# Patient Record
(demographics unavailable — no encounter records)

---

## 2024-10-10 NOTE — PHYSICAL EXAM
[Bilateral] : foot and ankle bilaterally [NL (40)] : plantar flexion 40 degrees [NL 30)] : inversion 30 degrees [NL (20)] : eversion 20 degrees [5___] : eversion 5[unfilled]/5 [2+] : dorsalis pedis pulse: 2+ [] : patient ambulates without assistive device [FreeTextEntry8] : non specific dorsal foot pain [TWNoteComboBox7] : dorsiflexion 15 degrees
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
## 2024-10-16 NOTE — ASSESSMENT
[FreeTextEntry1] : The patient was advised of the diagnosis. The natural history of the pathology was explained in full to the patient in layman's terms. All questions were answered. The risks and benefits of surgical and non-surgical treatment alternatives were explained in full to the patient.  pt was given OT rx  Cannot take nsaids due to gastric bypass.  RTO 4 weeks
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
## 2024-10-16 NOTE — IMAGING
[de-identified] : pt with bilateral hand diffuse ttp over the 2nd - 4th MC regions with no palpable deformity all digits are nvi   and intrinsic strength is 4/5 due to guarding mild diffuse ttp over the bilateral wrists. There is no anatomic snuffbox TTP to either wrist ROM is full with pain on flexion and extension symmetrically Askew, TFCC Grind and Finkelstein Tests are negative symmetrically. Negative Tinel over the bilateral Carpal and Cubital Tunnels.  Left elbow ttp over medial condyle no ligamentous laxity ROM is full strength is 5/5 in all planes.   Right elbow with ttp over the lateral condyle no ligamentous laxity ROM is full strength is 5/5 in all planes.

## 2024-10-17 NOTE — ASSESSMENT
[FreeTextEntry1] : Needs MRIs both knees to r/o internal derangement, lumbar spine to r/o HNP PT ordered
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
## 2024-10-17 NOTE — IMAGING
[Disc space narrowing] : Disc space narrowing [Bilateral] : knee bilaterally [There are no fractures, subluxations or dislocations. No significant abnormalities are seen] : There are no fractures, subluxations or dislocations. No significant abnormalities are seen

## 2024-10-17 NOTE — PHYSICAL EXAM
[Flexion] : flexion [Bending to left] : bending to left [Bending to right] : bending to right [Bilateral] : knee bilaterally [NL (0)] : extension 0 degrees [5___] : hamstring 5[unfilled]/5 [Equivocal] : equivocal Mary [] : able to do straight leg raise [TWNoteComboBox7] : flexion 125 degrees
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
## 2024-10-22 NOTE — PHYSICAL EXAM
[Bilateral] : shoulder bilaterally [4 ___] : forward flexion 4[unfilled]/5 [4___] : external rotation 4[unfilled]/5 [] : pain with strength testing [FreeTextEntry3] : incisions R shoulder from prior surgery [FreeTextEntry9] : FE R 120  L 130 ER R 40  L 40

## 2024-10-22 NOTE — DATA REVIEWED
[Right] : of the right [MRI] : MRI [Left] : left [Shoulder] : shoulder [I independently reviewed and interpreted images and report] : I independently reviewed and interpreted images and report [FreeTextEntry1] : PRCT, bursitis [FreeTextEntry2] : AC joint sprain, bursitis
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
## 2024-10-23 NOTE — DATA REVIEWED
[MRI] : MRI [Cervical Spine] : cervical spine [I independently reviewed and interpreted images and report] : I independently reviewed and interpreted images and report [FreeTextEntry1] : multilevel HNP
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
## 2024-11-05 NOTE — DATA REVIEWED
[Lumbar Spine] : lumbar spine [MRI] : MRI [Bilateral] : of the bilateral [Knee] : knee [I reviewed the films/CD and agree] : I reviewed the films/CD and agree
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
## 2024-11-05 NOTE — REASON FOR VISIT
[FreeTextEntry2] : 11/05/2024 Had MRIs: lumbar: multilevel ddd, disc bulges, neuroforaminal narrowing. Left knee: tricompartmental OA, no meniscal tears Right knee: tricompartmental OA, no meniscal tears
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
## 2024-12-04 NOTE — IMAGING
[Straightening consistent with spasm] : Straightening consistent with spasm [Disc space narrowing] : Disc space narrowing [No instability seen on flexion/extension] : No instability seen on flexion/extension [FreeTextEntry1] : alignment corrects on extension
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
## 2024-12-05 NOTE — ASSESSMENT
[FreeTextEntry1] : Will get authorization for Euflexxa x3 both knees Continue PT for low back and knees Can see Pain Management for her low back as well
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
## 2024-12-18 NOTE — IMAGING
[de-identified] : pt with bilateral hand diffuse ttp over the 2nd - 4th MC regions with no palpable deformity all digits are nvi   and intrinsic strength is 4/5 due to guarding mild diffuse ttp over the bilateral wrists. There is no anatomic snuffbox TTP to either wrist ROM is full with pain on flexion and extension symmetrically Askew, TFCC Grind and Finkelstein Tests are negative symmetrically. Negative Tinel over the bilateral Carpal and Cubital Tunnels.  Left elbow ttp over medial condyle no ligamentous laxity ROM is full strength is 5/5 in all planes.   Right elbow with ttp over the lateral condyle and medial condyle no ligamentous laxity ROM is full strength is 5/5 in all planes.

## 2024-12-18 NOTE — IMAGING
[de-identified] : pt with bilateral hand diffuse ttp over the 2nd - 4th MC regions with no palpable deformity all digits are nvi   and intrinsic strength is 4/5 due to guarding mild diffuse ttp over the bilateral wrists. There is no anatomic snuffbox TTP to either wrist ROM is full with pain on flexion and extension symmetrically Askew, TFCC Grind and Finkelstein Tests are negative symmetrically. Negative Tinel over the bilateral Carpal and Cubital Tunnels.  Left elbow ttp over medial condyle no ligamentous laxity ROM is full strength is 5/5 in all planes.   Right elbow with ttp over the lateral condyle and medial condyle no ligamentous laxity ROM is full strength is 5/5 in all planes.

## 2024-12-18 NOTE — ASSESSMENT
[FreeTextEntry1] : The patient was advised of the diagnosis. The natural history of the pathology was explained in full to the patient in layman's terms. All questions were answered. The risks and benefits of surgical and non-surgical treatment alternatives were explained in full to the patient.  pt was given OT rx  Cannot take nsaids due to gastric bypass.  RTO 6 weeks
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
## 2024-12-19 NOTE — DISCUSSION/SUMMARY
[de-identified] : After discussing various treatment options with the patient including but not limited to oral medications, physical therapy, exercise modalities as well as interventional spinal injections, we have decided with the following plan:   - Continue Home exercises, stretching, activity modification, physical therapy, and conservative care. - MRI report and/or images was reviewed and discussed with the patient. - Recommend C7-T1 Cervical Epidural Steroid Injection under fluoroscopic guidance with image. - Of note, the C7-T1 level has been determined to be the safest level to inject in the cervical spine as the epidural space is the largest. Despite pathology at different levels, studies have shown that the medication will spread up to the most cranial level, despite the level of injection. - The risks, benefits and alternatives of the proposed procedure were explained in detail with the patient. The risks outlined include but are not limited to infection, bleeding, post-dural puncture headache, nerve injury, a temporary increase in pain, failure to resolve symptoms, allergic reaction, symptom recurrence, and possible elevation of blood sugar in diabetics. All questions were answered to patient's apparent satisfaction and he/she verbalized an understanding. - Patient is presenting with acute/sub-acute radicular pain with impairment in ADLs and functionality.  The pain has not responded to conservative care including NSAID therapy and/or physical therapy.  There is no bleeding tendency, unstable medical condition, or systemic infection. - Follow up in 1-2 weeks post injection for re-evaluation.

## 2024-12-19 NOTE — PHYSICAL EXAM
[de-identified] : Constitutional; Appears well, no apparent distress Ability to communicate: Normal  Respiratory: non-labored breathing Skin: No rash noted Head: Normocephalic, atraumatic Neck: no visible thyroid enlargement Eyes: Extraocular movements intact Neurologic: Alert and oriented x3 Psychiatric: normal mood, affect and behavior [] : positive [place]

## 2024-12-20 NOTE — ASSESSMENT
[FreeTextEntry1] : Euflexxa bilateral knees started today post injection instructions Continue PT for low back and knees Can see Pain Management for her low back as well

## 2024-12-20 NOTE — PHYSICAL EXAM
[Flexion] : flexion [Bending to left] : bending to left [Bending to right] : bending to right [Bilateral] : knee bilaterally [NL (0)] : extension 0 degrees [5___] : hamstring 5[unfilled]/5 [Equivocal] : equivocal Mary [] : negative Lachmann [TWNoteComboBox7] : flexion 125 degrees
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
## 2024-12-20 NOTE — PROCEDURE
[Large Joint Injection] : Large joint injection [Bilateral] : bilaterally of the [Knee] : knee [Pain] : pain [Alcohol] : alcohol [Betadine] : betadine [Ethyl Chloride sprayed topically] : ethyl chloride sprayed topically [Sterile technique used] : sterile technique used [Euflexxa(20mg)] : 20mg of Euflexxa [#1] : series #1
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
## 2024-12-27 NOTE — ASSESSMENT
[FreeTextEntry1] : Euflexxa 2 post injection instructions Continue PT for low back and knees Can see Pain Management for her low back as well

## 2024-12-27 NOTE — PROCEDURE
[Large Joint Injection] : Large joint injection [Bilateral] : bilaterally of the [Knee] : knee [Pain] : pain [Alcohol] : alcohol [Betadine] : betadine [Ethyl Chloride sprayed topically] : ethyl chloride sprayed topically [Sterile technique used] : sterile technique used [Euflexxa(20mg)] : 20mg of Euflexxa [#2] : series #2 [] : Patient tolerated procedure well [Call if redness, pain or fever occur] : call if redness, pain or fever occur [Apply ice for 15min out of every hour for the next 12-24 hours as tolerated] : apply ice for 15 minutes out of every hour for the next 12-24 hours as tolerated [Patient was advised to rest the joint(s) for ____ days] : patient was advised to rest the joint(s) for [unfilled] days [Previous OTC use and PT nontherapeutic] : patient has tried OTC's including aspirin, Ibuprofen, Aleve, etc or prescription NSAIDS, and/or exercises at home and/or physical therapy without satisfactory response [Patient had decreased mobility in the joint] : patient had decreased mobility in the joint [Risks, benefits, alternatives discussed / Verbal consent obtained] : the risks benefits, and alternatives have been discussed, and verbal consent was obtained
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
## 2025-01-13 NOTE — PROCEDURE
[FreeTextEntry3] : Date of Service: 01/13/2025   Account: [id number]  Patient: OUMAR SUGGS   YOB: 1975  Age: 49 year   Surgeon:                                                         Humberto Ritter D.O.  Pre-Operative Diagnosis:                            Cervical Radiculopathy (M54.12)  Post-Operative Diagnosis:                          Cervical Radiculopathy (M54.12)  Procedure:                                                     Interlaminar cervical epidural steroid injection (C7-T1) under fluoroscopic guidance  Anesthesia:                                                    Local with MAC   This procedure was carried out using fluoroscopic guidance.  The risks and benefits of the procedure were discussed extensively with the patient.  The consent of the patient was obtained and the following procedure was performed.  The patient was placed in the prone position using thoracic and chin supports.  The cervical area was prepped and draped in a sterile fashion. A timeout was performed with all essential staff present and the site and side were verified. The fluoroscope visualized the C7-T1 interspace using slight cephalad-caudad angulation and this area was marked.  Using sterile technique, the superficial skin was anesthetized with 1% Lidocaine without epinephrine.  An 18-gauge Tuohy needle was advanced under fluoroscopy using xnkhx-fdumytjcy-degvi technique until ligament was engaged.  The stylette was then removed and a column of preservative free normal saline flushed through the Tuohy needle and left with a concave fluid level above the butterfly portion of the Tuohy needle.  The needle was then advanced under fluoroscopic guidance until the column of saline disappeared.  Lateral view confirmed final needle tip placement in the epidural space.  After negative aspiration for heme and CSF, 1 cc of Omnipaque contrast injected under live fluoroscopy, confirmed good cervical epidurogram.    Cervical epidurogram showed no evidence of intrathecal or intravascular flow, and good bilateral epidural flow from C3 to T2 levels.  An injectate of 3 cc of preservative free normal saline plus 12 mg of betamethasone was then injected into the epidural space. The needle was subsequently removed and pressure was applied.  Anesthesia personnel were present throughout the procedure.  The patient tolerated the procedure well and was instructed to contact me immediately if there were any problems.  Humberto Ritter D.O.

## 2025-01-16 NOTE — ASSESSMENT
[FreeTextEntry1] : B/L shoulder strain and bursitis. H/O R RCR 9/9/21 MRI R shoulder: PRCT, bursitis MRI L shoulder: AC joint sprain, bursitis She is in PT, will continue. Diclofenac gel prn.  OOW.  RTO 8 weeks.
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
## 2025-01-16 NOTE — PHYSICAL EXAM
[Bilateral] : shoulder bilaterally [4 ___] : forward flexion 4[unfilled]/5 [] : pain with strength testing [5___] : external rotation 5[unfilled]/5 [FreeTextEntry3] : incisions R shoulder from prior surgery [FreeTextEntry9] : FE R 120  L 130 ER R 50  L 40

## 2025-01-30 NOTE — PROCEDURE
[FreeTextEntry3] : Procedure Name: Trigger Point Injection (1-2 muscle groups): Celestone and Marcaine Trigger Point was performed because of pain.  Celestone: An injection of Celestone 6 mg Marcaine: An injection of Marcaine 10 mg Medication was injected in the Bilateral Cervical Paraspinal muscle.   Patient has tried OTC's including aspirin, Ibuprofen, Aleve etc or prescription NSAIDS, and/or exercises at home and/ or physical therapy without satisfactory response. After verbal consent using sterile preparation and technique.The risks, benefits, and alternatives to cortisone injection were explained in full to the patient. Risks outlined include but are not limited to infection, sepsis, bleeding, scarring, skin discoloration, temporary increase in pain, syncopal episode, failure to resolve symptoms, allergic reaction, symptom recurrence, and elevation of blood sugar in diabetics. Patient understood the risks. All questions were answered. After discussion of options, patient requested an injection. Oral informed consent was obtained and sterile prep was done of the injection site. Sterile technique was utilized for the procedure including the preparation of the solutions used for the injection. Patient tolerated the procedure well. Advised to ice the injection site this evening. Prep with alcohol locally to site. Sterile technique used.

## 2025-01-30 NOTE — DISCUSSION/SUMMARY
[de-identified] : After discussing various treatment options with the patient including but not limited to oral medications, physical therapy, exercise modalities as well as interventional spinal injections, we have decided with the following plan:   - Continue Home exercises, stretching, activity modification, physical therapy, and conservative care. - MRI report and/or images was reviewed and discussed with the patient. - Recommend C7-T1 Cervical Epidural Steroid Injection under fluoroscopic guidance with image. - Of note, the C7-T1 level has been determined to be the safest level to inject in the cervical spine as the epidural space is the largest. Despite pathology at different levels, studies have shown that the medication will spread up to the most cranial level, despite the level of injection. - The risks, benefits and alternatives of the proposed procedure were explained in detail with the patient. The risks outlined include but are not limited to infection, bleeding, post-dural puncture headache, nerve injury, a temporary increase in pain, failure to resolve symptoms, allergic reaction, symptom recurrence, and possible elevation of blood sugar in diabetics. All questions were answered to patient's apparent satisfaction and he/she verbalized an understanding. - Patient is presenting with acute/sub-acute radicular pain with impairment in ADLs and functionality.  The pain has not responded to conservative care including NSAID therapy and/or physical therapy.  There is no bleeding tendency, unstable medical condition, or systemic infection. - Follow up in 1-2 weeks post injection for re-evaluation.

## 2025-01-30 NOTE — PHYSICAL EXAM
[de-identified] : Constitutional; Appears well, no apparent distress Ability to communicate: Normal  Respiratory: non-labored breathing Skin: No rash noted Head: Normocephalic, atraumatic Neck: no visible thyroid enlargement Eyes: Extraocular movements intact Neurologic: Alert and oriented x3 Psychiatric: normal mood, affect and behavior [] : paracervical tenderness
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
## 2025-03-21 NOTE — ASSESSMENT
[FreeTextEntry1] : referred back to PT No surgery warranted Cannot take NSAIDs due to gastric bypass surgery

## 2025-03-24 NOTE — PROCEDURE
[FreeTextEntry3] : Date of Service: 03/24/2025   Account: [id number]  Patient: OUMAR SUGGS   YOB: 1975  Age: 49 year   Surgeon:                                                         Humberto Ritter D.O.  Pre-Operative Diagnosis:                            Cervical Radiculopathy (M54.12)  Post-Operative Diagnosis:                          Cervical Radiculopathy (M54.12)  Procedure:                                                     Interlaminar cervical epidural steroid injection (C7-T1) under fluoroscopic guidance  Anesthesia:                                                    Local with MAC   This procedure was carried out using fluoroscopic guidance.  The risks and benefits of the procedure were discussed extensively with the patient.  The consent of the patient was obtained and the following procedure was performed.  The patient was placed in the prone position using thoracic and chin supports.  The cervical area was prepped and draped in a sterile fashion. A timeout was performed with all essential staff present and the site and side were verified. The fluoroscope visualized the C7-T1 interspace using slight cephalad-caudad angulation and this area was marked.  Using sterile technique, the superficial skin was anesthetized with 1% Lidocaine without epinephrine.  An 18-gauge Tuohy needle was advanced under fluoroscopy using swzim-aqwzcyako-wktpl technique until ligament was engaged.  The stylette was then removed and a column of preservative free normal saline flushed through the Tuohy needle and left with a concave fluid level above the butterfly portion of the Tuohy needle.  The needle was then advanced under fluoroscopic guidance until the column of saline disappeared.  Lateral view confirmed final needle tip placement in the epidural space.  After negative aspiration for heme and CSF, 1 cc of Omnipaque contrast injected under live fluoroscopy, confirmed good cervical epidurogram.    Cervical epidurogram showed no evidence of intrathecal or intravascular flow, and good bilateral epidural flow from C3 to T2 levels.  An injectate of 3 cc of preservative free normal saline plus 12 mg of betamethasone was then injected into the epidural space. The needle was subsequently removed and pressure was applied.  Anesthesia personnel were present throughout the procedure.  The patient tolerated the procedure well and was instructed to contact me immediately if there were any problems.  Humberto Ritter D.O.

## 2025-04-10 NOTE — DISCUSSION/SUMMARY
[de-identified] : After discussing various treatment options with the patient including but not limited to oral medications, physical therapy, exercise modalities as well as interventional spinal injections, we have decided with the following plan:   - Continue Home exercises, stretching, activity modification, physical therapy, and conservative care. - MRI report and/or images was reviewed and discussed with the patient. - Recommend L4-5 Lumbar Epidural Steroid Injection under fluoroscopic guidance with image. (L>R)  - The risks, benefits and alternatives of the proposed procedure were explained in detail with the patient. The risks outlined include but are not limited to infection, bleeding, post-dural puncture headache, nerve injury, a temporary increase in pain, failure to resolve symptoms, allergic reaction, symptom recurrence, and possible elevation of blood sugar in diabetics. All questions were answered to patient's apparent satisfaction and he/she verbalized an understanding. - Patient is presenting with acute/sub-acute radicular pain with impairment in ADLs and functionality.  The pain has not responded to conservative care including NSAID therapy and/or physical therapy.  There is no bleeding tendency, unstable medical condition, or systemic infection. - Follow up in 1-2 weeks post injection for re-evaluation.

## 2025-04-10 NOTE — HISTORY OF PRESENT ILLNESS
[FreeTextEntry1] : 04/10/2025: s/p C7-T1 JEF on 03/24/25 with little relief. Had TPI at last visit with temporary relief.  also having left lower back radiating down the left lateral thigh described as a sharp shooting pain. MRI L-spine independently reviewed.   01/30/2025: s/p C7-T1 JEF on 1/13/25 with >50% relief and improvement of ADLs. Pain was great for a few days and then started to return.   Initial HPI 12/19/2024: NF 9/110/24  Pain is on the bilateral neck L>R and radiates down the bilateral arms to the middle fingers described as a numbness and tingling. Saw Dr. Guicho Ritter who recommended АЛЕКСАНДР.   MRI Cervical Spine 10/8/24 independently reviewed: multilevel HNPs; C4-5, C5-6 spinal stenosis  Conservative Care: has been doing PT with no relief  Pain Medications: Tried Gabapentin, Tizanidine, Diclofenac with little relief  Past Injections: CESIs/LESIs in the past  Spine surgery: none  Blood thinners: none [] : Post Surgical Visit: no [FreeTextEntry3] : 9/10/2024 [FreeTextEntry6] : stiffness, tingling  [FreeTextEntry7] : b/l arms  [de-identified] : Using the arm, resting the arm in the wrong way  [de-identified] : OC L MRI  [TWNoteComboBox1] : 50%

## 2025-04-10 NOTE — DISCUSSION/SUMMARY
[de-identified] : After discussing various treatment options with the patient including but not limited to oral medications, physical therapy, exercise modalities as well as interventional spinal injections, we have decided with the following plan:   - Continue Home exercises, stretching, activity modification, physical therapy, and conservative care. - MRI report and/or images was reviewed and discussed with the patient. - Recommend L4-5 Lumbar Epidural Steroid Injection under fluoroscopic guidance with image. (L>R)  - The risks, benefits and alternatives of the proposed procedure were explained in detail with the patient. The risks outlined include but are not limited to infection, bleeding, post-dural puncture headache, nerve injury, a temporary increase in pain, failure to resolve symptoms, allergic reaction, symptom recurrence, and possible elevation of blood sugar in diabetics. All questions were answered to patient's apparent satisfaction and he/she verbalized an understanding. - Patient is presenting with acute/sub-acute radicular pain with impairment in ADLs and functionality.  The pain has not responded to conservative care including NSAID therapy and/or physical therapy.  There is no bleeding tendency, unstable medical condition, or systemic infection. - Follow up in 1-2 weeks post injection for re-evaluation.

## 2025-04-10 NOTE — HISTORY OF PRESENT ILLNESS
[FreeTextEntry1] : 04/10/2025: s/p C7-T1 JEF on 03/24/25 with little relief. Had TPI at last visit with temporary relief.  also having left lower back radiating down the left lateral thigh described as a sharp shooting pain. MRI L-spine independently reviewed.   01/30/2025: s/p C7-T1 JEF on 1/13/25 with >50% relief and improvement of ADLs. Pain was great for a few days and then started to return.   Initial HPI 12/19/2024: NF 9/110/24  Pain is on the bilateral neck L>R and radiates down the bilateral arms to the middle fingers described as a numbness and tingling. Saw Dr. Guicho Ritter who recommended АЛЕКСАНДР.   MRI Cervical Spine 10/8/24 independently reviewed: multilevel HNPs; C4-5, C5-6 spinal stenosis  Conservative Care: has been doing PT with no relief  Pain Medications: Tried Gabapentin, Tizanidine, Diclofenac with little relief  Past Injections: CESIs/LESIs in the past  Spine surgery: none  Blood thinners: none [] : Post Surgical Visit: no [FreeTextEntry3] : 9/10/2024 [FreeTextEntry6] : stiffness, tingling  [FreeTextEntry7] : b/l arms  [de-identified] : Using the arm, resting the arm in the wrong way  [de-identified] : OC L MRI  [TWNoteComboBox1] : 50%

## 2025-04-10 NOTE — PHYSICAL EXAM
[de-identified] : Constitutional; Appears well, no apparent distress Ability to communicate: Normal  Respiratory: non-labored breathing Skin: No rash noted Head: Normocephalic, atraumatic Neck: no visible thyroid enlargement Eyes: Extraocular movements intact Neurologic: Alert and oriented x3 Psychiatric: normal mood, affect and behavior [] : no ecchymosis

## 2025-04-10 NOTE — PHYSICAL EXAM
[de-identified] : Constitutional; Appears well, no apparent distress Ability to communicate: Normal  Respiratory: non-labored breathing Skin: No rash noted Head: Normocephalic, atraumatic Neck: no visible thyroid enlargement Eyes: Extraocular movements intact Neurologic: Alert and oriented x3 Psychiatric: normal mood, affect and behavior [] : no ecchymosis
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
## 2025-05-08 NOTE — DISCUSSION/SUMMARY
[de-identified] : After discussing various treatment options with the patient including but not limited to oral medications, physical therapy, exercise modalities as well as interventional spinal injections, we have decided with the following plan:   - Continue Home exercises, stretching, activity modification, physical therapy, and conservative care. - MRI report and/or images was reviewed and discussed with the patient. - Recommend L4-5 Lumbar Epidural Steroid Injection under fluoroscopic guidance with image. (L>R)  - The risks, benefits and alternatives of the proposed procedure were explained in detail with the patient. The risks outlined include but are not limited to infection, bleeding, post-dural puncture headache, nerve injury, a temporary increase in pain, failure to resolve symptoms, allergic reaction, symptom recurrence, and possible elevation of blood sugar in diabetics. All questions were answered to patient's apparent satisfaction and he/she verbalized an understanding. - Patient is presenting with acute/sub-acute radicular pain with impairment in ADLs and functionality.  The pain has not responded to conservative care including NSAID therapy and/or physical therapy.  There is no bleeding tendency, unstable medical condition, or systemic infection. - Follow up in 1-2 weeks post injection for re-evaluation.
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
## 2025-05-08 NOTE — PHYSICAL EXAM
[] : positive [place] [de-identified] : Constitutional; Appears well, no apparent distress Ability to communicate: Normal  Respiratory: non-labored breathing Skin: No rash noted Head: Normocephalic, atraumatic Neck: no visible thyroid enlargement Eyes: Extraocular movements intact Neurologic: Alert and oriented x3 Psychiatric: normal mood, affect and behavior

## 2025-05-19 NOTE — PROCEDURE
[FreeTextEntry3] : Date of Service: 05/19/2025   Account: [id number]  Patient: OUMAR SUGGS   YOB: 1975  Age: 49 year   Surgeon:                                                         Humberto Ritter D.O.  Pre-Operative Diagnosis:                             Lumbosacral radiculitis  Post-Operative Diagnosis:                           Same  Procedure:                                                      Interlaminar lumbar epidural steroid injection (L4-5) under fluoroscopic guidance  Anesthesia:                                                     Local with MAC   This procedure was carried out using fluoroscopic guidance.  The risks and benefits of the procedure were discussed extensively with the patient.  The consent of the patient was obtained and the following procedure was performed.  The patient was placed in the prone position.  The lumbar area was prepped and draped in a sterile fashion.  A timeout was performed with all essential staff present and the site and side were verified. Under AP view with slight cephalad-caudad angulation, the L4-5 interspace was identified and marked.  Using sterile technique, the superficial skin was anesthetized with 1% Lidocaine without epinephrine.  A 20-gauge Tuohy needle was advanced into the epidural space under fluoroscopy using wkyrs-[id number] technique and using loss of resistance at the L4-5 level.  After negative aspiration for heme or CSF, an epidurogram was obtained using 2-3 cc Omnipaque contrast injected under live fluoroscopy, confirming epidural placement of the needle.    Epidurogram showed no evidence of intrathecal or intravascular flow, and good evidence of bilateral epidural flow from L3-S2 levels.  After this, 4 cc of preservative free normal saline plus 12 mg of betamethasone were injected into the epidural space.  The needle was subsequently removed.  Anesthesia personnel were present throughout the procedure.  The patient tolerated the procedure well and was instructed to contact me immediately if there were any problems.  Humberto Ritter D.O.

## 2025-06-04 NOTE — PHYSICAL EXAM
[de-identified] : Constitutional; Appears well, no apparent distress Ability to communicate: Normal  Respiratory: non-labored breathing Skin: No rash noted Head: Normocephalic, atraumatic Neck: no visible thyroid enlargement Eyes: Extraocular movements intact Neurologic: Alert and oriented x3 Psychiatric: normal mood, affect and behavior

## 2025-06-04 NOTE — DISCUSSION/SUMMARY
[de-identified] : After discussing various treatment options with the patient including but not limited to oral medications, physical therapy, exercise modalities as well as interventional spinal injections, we have decided with the following plan:   - Continue Home exercises, stretching, activity modification, physical therapy, and conservative care. - MRI report and/or images was reviewed and discussed with the patient. - Recommend L4-5 Lumbar Epidural Steroid Injection under fluoroscopic guidance with image. (L>R)  - The risks, benefits and alternatives of the proposed procedure were explained in detail with the patient. The risks outlined include but are not limited to infection, bleeding, post-dural puncture headache, nerve injury, a temporary increase in pain, failure to resolve symptoms, allergic reaction, symptom recurrence, and possible elevation of blood sugar in diabetics. All questions were answered to patient's apparent satisfaction and he/she verbalized an understanding. - Patient is presenting with acute/sub-acute radicular pain with impairment in ADLs and functionality.  The pain has not responded to conservative care including NSAID therapy and/or physical therapy.  There is no bleeding tendency, unstable medical condition, or systemic infection. - Follow up in 1-2 weeks post injection for re-evaluation.
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
## 2025-06-04 NOTE — DISCUSSION/SUMMARY
[de-identified] : After discussing various treatment options with the patient including but not limited to oral medications, physical therapy, exercise modalities as well as interventional spinal injections, we have decided with the following plan:   - Continue Home exercises, stretching, activity modification, physical therapy, and conservative care. - MRI report and/or images was reviewed and discussed with the patient. - Recommend L4-5 Lumbar Epidural Steroid Injection under fluoroscopic guidance with image. (L>R)  - The risks, benefits and alternatives of the proposed procedure were explained in detail with the patient. The risks outlined include but are not limited to infection, bleeding, post-dural puncture headache, nerve injury, a temporary increase in pain, failure to resolve symptoms, allergic reaction, symptom recurrence, and possible elevation of blood sugar in diabetics. All questions were answered to patient's apparent satisfaction and he/she verbalized an understanding. - Patient is presenting with acute/sub-acute radicular pain with impairment in ADLs and functionality.  The pain has not responded to conservative care including NSAID therapy and/or physical therapy.  There is no bleeding tendency, unstable medical condition, or systemic infection. - Follow up in 1-2 weeks post injection for re-evaluation.

## 2025-06-04 NOTE — PHYSICAL EXAM
[de-identified] : Constitutional; Appears well, no apparent distress Ability to communicate: Normal  Respiratory: non-labored breathing Skin: No rash noted Head: Normocephalic, atraumatic Neck: no visible thyroid enlargement Eyes: Extraocular movements intact Neurologic: Alert and oriented x3 Psychiatric: normal mood, affect and behavior
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
